# Patient Record
Sex: MALE | URBAN - METROPOLITAN AREA
[De-identification: names, ages, dates, MRNs, and addresses within clinical notes are randomized per-mention and may not be internally consistent; named-entity substitution may affect disease eponyms.]

---

## 2021-02-22 ENCOUNTER — HOSPITAL ENCOUNTER (OUTPATIENT)
Age: 51
Setting detail: SPECIMEN
Discharge: HOME | End: 2021-02-22
Payer: COMMERCIAL

## 2021-02-24 LAB — DERMATOLOGY PATHOLOGY REPORT: NORMAL

## 2021-04-12 ENCOUNTER — HOSPITAL ENCOUNTER (OUTPATIENT)
Age: 51
Setting detail: SPECIMEN
Discharge: HOME | End: 2021-04-12
Payer: COMMERCIAL

## 2021-04-14 LAB — DERMATOLOGY PATHOLOGY REPORT: NORMAL

## 2025-07-05 ENCOUNTER — HOSPITAL ENCOUNTER (EMERGENCY)
Facility: CLINIC | Age: 55
Discharge: HOME | End: 2025-07-05
Attending: EMERGENCY MEDICINE
Payer: COMMERCIAL

## 2025-07-05 VITALS
RESPIRATION RATE: 19 BRPM | HEIGHT: 75 IN | TEMPERATURE: 98.2 F | DIASTOLIC BLOOD PRESSURE: 97 MMHG | HEART RATE: 80 BPM | SYSTOLIC BLOOD PRESSURE: 149 MMHG | OXYGEN SATURATION: 98 % | BODY MASS INDEX: 23 KG/M2 | WEIGHT: 185 LBS

## 2025-07-05 DIAGNOSIS — M70.21 OLECRANON BURSITIS, RIGHT ELBOW: Primary | ICD-10-CM

## 2025-07-05 PROCEDURE — 99283 EMERGENCY DEPT VISIT LOW MDM: CPT

## 2025-07-05 RX ORDER — LORATADINE 10 MG/1
TABLET ORAL
COMMUNITY

## 2025-07-05 RX ORDER — DOXYCYCLINE 100 MG/1
100 TABLET ORAL 2 TIMES DAILY
Qty: 14 TABLET | Refills: 0 | Status: SHIPPED | OUTPATIENT
Start: 2025-07-05 | End: 2025-07-12

## 2025-07-05 RX ORDER — ATORVASTATIN CALCIUM 10 MG/1
10 TABLET, FILM COATED ORAL DAILY
COMMUNITY
Start: 2025-04-21

## 2025-07-05 ASSESSMENT — PAIN DESCRIPTION - ORIENTATION: ORIENTATION: RIGHT

## 2025-07-05 ASSESSMENT — PAIN SCALES - GENERAL: PAINLEVEL_OUTOF10: 5

## 2025-07-05 ASSESSMENT — PAIN DESCRIPTION - LOCATION: LOCATION: ELBOW

## 2025-07-05 ASSESSMENT — PAIN - FUNCTIONAL ASSESSMENT: PAIN_FUNCTIONAL_ASSESSMENT: 0-10

## 2025-07-05 ASSESSMENT — PAIN DESCRIPTION - DESCRIPTORS: DESCRIPTORS: DISCOMFORT

## 2025-07-06 NOTE — ED PROVIDER NOTES
[facility]  [address] [facility] [address]  Phone: [phone]      Patient Name:  Jose M Torres  Medical Record Number:  [id number]  YOB: 1970  Date of Service:  7/5/2025  Primary Care Physician:  No primary care provider on file.      CHIEF COMPLAINT:       Chief Complaint   Patient presents with    Elbow Pain       HISTORY OF PRESENT ILLNESS:   Jose M Torres is a 54 y.o. male who presents with the complaint of right elbow pain.  The patient reports that starting 3 days ago he woke up with pain, redness, and swelling to the tip of his right elbow.  He took ibuprofen, elevated the right elbow, and applied ice packs with some improvement.  He states that the symptoms were getting better until today when he was mowing the lawn, doing yard work, and then grilling.  He states that he has a constant, sharp, stabbing, nonradiating pain to the elbow that is worse with palpation and movement.  He denies any injury or trauma.  He is right-hand dominant.  He does often rest on his elbows when working at his desk or in the car.  He denies any previous injury or surgery to the right elbow.  The patient was concerned that the redness to the elbow was getting worse tonight.  He denies fever, chills, headache, vision changes, neck pain, back pain, chest pain, shortness of breath, abdominal pain, urinary/bowel symptoms, focal weakness, numbness, tingling, shoulder pain, wrist pain, recent injury or illness.    CURRENT MEDICATIONS:      Discharge Medication List as of 7/5/2025  9:09 PM        CONTINUE these medications which have NOT CHANGED    Details   atorvastatin (LIPITOR) 10 MG tablet Take 1 tablet by mouth dailyHistorical Med      loratadine (CLARITIN) 10 MG tablet 1 tab(s) orally once a dayHistorical Med      azithromycin (ZITHROMAX Z-SAEED) 250 MG tablet Take 1 tablet by mouth daily for 5 days., Disp-6 tablet, R-0Take 2 tabs today then 1 tab days 2-5.Normal             ALLERGIES:

## 2025-07-06 NOTE — DISCHARGE INSTRUCTIONS
Take your medication as indicated and prescribed.  For pain use acetaminophen (Tylenol) or ibuprofen (Motrin / Advil), unless prescribed medications that have acetaminophen or ibuprofen (or similar medications) in it.  You can take over the counter acetaminophen tablets (1 - 2 tablets of the 500-mg strength every 6 hours) or ibuprofen tablets (2 tablets every 4 hours).    Use an ice pack or bag filled with ice and apply to the injured area 3 - 4 times a day for 15 - 20 minutes each time.  Consider wearing a pad to protect the elbow from being irritated.     If your symptoms do not improve over the next 48 hours with conservative treatment, if you develop a fever, or if the redness spreads, then start the antibiotic.    PLEASE RETURN TO THE EMERGENCY DEPARTMENT IMMEDIATELY for worsening of pain, worse swelling to your elbow or wrist, inability to move your arm / wrist, or if you develop any concerning symptoms such as: high fever not relieved by acetaminophen (Tylenol) and/or ibuprofen (Motrin / Advil), chills, feeling of your heart fluttering or racing, persistent nausea and/or vomiting, vomiting up blood, blood in your stool, loss of consciousness, numbness, weakness or tingling in the arms or legs or change in color of the extremities, changes in mental status, persistent headache, blurry vision, loss of bladder / bowel control, unable to follow up with your physician, or other any other care or concern.

## 2025-07-06 NOTE — ED NOTES
Pt presents to ED ambulatory a&O x4. Pt states that he started about 48 hours ago with R elbow pain, swelling and redness. Pt states he thought it was getting better but tonight the redness seems to have spread. Denies injury.